# Patient Record
Sex: FEMALE | Race: BLACK OR AFRICAN AMERICAN | NOT HISPANIC OR LATINO | URBAN - METROPOLITAN AREA
[De-identification: names, ages, dates, MRNs, and addresses within clinical notes are randomized per-mention and may not be internally consistent; named-entity substitution may affect disease eponyms.]

---

## 2017-06-24 ENCOUNTER — EMERGENCY (EMERGENCY)
Facility: HOSPITAL | Age: 37
LOS: 1 days | Discharge: HOME | End: 2017-06-24
Attending: EMERGENCY MEDICINE | Admitting: EMERGENCY MEDICINE
Payer: MEDICAID

## 2017-06-24 VITALS
DIASTOLIC BLOOD PRESSURE: 93 MMHG | RESPIRATION RATE: 20 BRPM | OXYGEN SATURATION: 100 % | TEMPERATURE: 98 F | HEART RATE: 95 BPM | SYSTOLIC BLOOD PRESSURE: 120 MMHG

## 2017-06-24 PROCEDURE — 99284 EMERGENCY DEPT VISIT MOD MDM: CPT

## 2017-06-24 RX ORDER — ACETAMINOPHEN 500 MG
650 TABLET ORAL ONCE
Qty: 0 | Refills: 0 | Status: COMPLETED | OUTPATIENT
Start: 2017-06-24 | End: 2017-06-24

## 2017-06-24 RX ADMIN — Medication 650 MILLIGRAM(S): at 09:54

## 2017-06-24 NOTE — ED PROVIDER NOTE - CHPI ED SYMPTOMS POS
THROAT PAIN/difficulty breathing, nasal congestion, decreased eating/drinking, spitting up saliva/mucous, nausea/COUGH

## 2017-06-24 NOTE — ED PROVIDER NOTE - MEDICAL DECISION MAKING DETAILS
35yo F with throat pain, cough, difficulty breathing, nasal congestion, decreased eating/drinking x1d. Likely viral URI. Spitting and nausea consistent with prior pregnancy. Able to tolerate food well. Pt is well enough to go home after tylenol and fetal check. Plan: fetal check, tylenol. D/c home with supportive care, f/u with PMD. Pt declining CXR/labs.

## 2017-06-24 NOTE — ED PROVIDER NOTE - CARE PLAN
Principal Discharge DX:	URI, acute  Goal:	followup care and supportive care  Instructions for follow-up, activity and diet:	Drink plenty of fluid.  Followup with your primary care doctor and ob/gyn within the next few days.  If fever, worse cough or shortness of breath, chest pain, unable to eat or drink, or any worse symptoms return to the ER.  If belly pain, cramps, bleeding, dizziness, or any worse symptoms return to the ER.

## 2017-06-24 NOTE — ED PROVIDER NOTE - PROGRESS NOTE DETAILS
pt feels well enough to go home and declined ivf, labs, and cxr.  Lungs were clear and well appearing.  Bedside sono also performed by Dr. Daily.  FHR by me 147.

## 2017-06-24 NOTE — ED ADULT TRIAGE NOTE - CHIEF COMPLAINT QUOTE
Pt co cold like symptoms  x 1 week , Pt co sore throat since last night and headache. Pt is afebrile in triage.

## 2017-06-24 NOTE — ED PROVIDER NOTE - OBJECTIVE STATEMENT
37yo F with no significant PMHx,, /A0, presents to ED c/o throat pain, cough, difficulty breathing, nasal congestion, decreased eating/drinking since yesterday evening. Pt also notes spitting up saliva/mucous, nausea since x20wks ago. Pt developed cold Sx yesterday night. She is able to tolerate PO. Her son is recovering from a cold with similar Sx. She has been nauseous and spitting up her entire pregnancy, consistent with prior pregnancy. Concern for pregnancy prompted visit to ED. Pt states she is otherwise well. Denies any issues with this pregnancy. OBGYN and PMD at [facility]. Denies vomiting, fever, abd pain, vaginal bleeding, HA. NKDA.

## 2017-06-24 NOTE — ED PROVIDER NOTE - CONSTITUTIONAL, MLM
normal... Well appearing, well nourished, awake, alert, oriented to person, place, time/situation and in no apparent distress. Speaking in clear sentences

## 2017-06-24 NOTE — ED PROVIDER NOTE - PLAN OF CARE
followup care and supportive care Drink plenty of fluid.  Followup with your primary care doctor and ob/gyn within the next few days.  If fever, worse cough or shortness of breath, chest pain, unable to eat or drink, or any worse symptoms return to the ER.  If belly pain, cramps, bleeding, dizziness, or any worse symptoms return to the ER.

## 2017-06-24 NOTE — ED PROVIDER NOTE - GASTROINTESTINAL, MLM
Abdomen gravid, soft, non-tender, no guarding. Abdomen gravid, soft, non-tender, no guarding.  [id number]

## 2017-11-09 ENCOUNTER — OUTPATIENT (OUTPATIENT)
Dept: OUTPATIENT SERVICES | Facility: HOSPITAL | Age: 37
LOS: 1 days | End: 2017-11-09

## 2017-11-09 ENCOUNTER — EMERGENCY (EMERGENCY)
Facility: HOSPITAL | Age: 37
LOS: 1 days | Discharge: STILL ADMITTED OR ADMINISTRATIVE | End: 2017-11-09
Admitting: EMERGENCY MEDICINE

## 2017-11-09 VITALS
TEMPERATURE: 99 F | OXYGEN SATURATION: 100 % | RESPIRATION RATE: 16 BRPM | HEART RATE: 87 BPM | SYSTOLIC BLOOD PRESSURE: 132 MMHG | DIASTOLIC BLOOD PRESSURE: 76 MMHG

## 2017-11-09 DIAGNOSIS — Z3A.00 WEEKS OF GESTATION OF PREGNANCY NOT SPECIFIED: ICD-10-CM

## 2017-11-09 DIAGNOSIS — O26.899 OTHER SPECIFIED PREGNANCY RELATED CONDITIONS, UNSPECIFIED TRIMESTER: ICD-10-CM

## 2017-11-09 NOTE — ED ADULT TRIAGE NOTE - CHIEF COMPLAINT QUOTE
pt arrives w/ c/o contractions 10 mins apart. SHERYL Nov 10th, pt denies any breaking of water but states spotting blood. L&D called pt to go to L&D.

## 2023-12-07 NOTE — ED PROVIDER NOTE - CPE EDP CARDIAC NORM
[address] [facility] [place] coding opportunities       Chart reviewed, no opportunity found: [id number]        Patients Insurance     Medicare Insurance: [employer] normal...